# Patient Record
Sex: FEMALE | Race: WHITE | NOT HISPANIC OR LATINO | URBAN - METROPOLITAN AREA
[De-identification: names, ages, dates, MRNs, and addresses within clinical notes are randomized per-mention and may not be internally consistent; named-entity substitution may affect disease eponyms.]

---

## 2022-11-07 VITALS
HEIGHT: 63 IN | WEIGHT: 115.08 LBS | SYSTOLIC BLOOD PRESSURE: 171 MMHG | DIASTOLIC BLOOD PRESSURE: 108 MMHG | RESPIRATION RATE: 18 BRPM | OXYGEN SATURATION: 94 % | HEART RATE: 115 BPM | TEMPERATURE: 99 F

## 2022-11-07 LAB
ALBUMIN SERPL ELPH-MCNC: 3.2 G/DL — LOW (ref 3.3–5)
ALBUMIN SERPL ELPH-MCNC: 3.5 G/DL — SIGNIFICANT CHANGE UP (ref 3.3–5)
ALP SERPL-CCNC: 89 U/L — SIGNIFICANT CHANGE UP (ref 40–120)
ALP SERPL-CCNC: 90 U/L — SIGNIFICANT CHANGE UP (ref 40–120)
ALT FLD-CCNC: 6 U/L — LOW (ref 10–45)
ALT FLD-CCNC: SIGNIFICANT CHANGE UP (ref 10–45)
ANION GAP SERPL CALC-SCNC: 11 MMOL/L — SIGNIFICANT CHANGE UP (ref 5–17)
ANION GAP SERPL CALC-SCNC: 11 MMOL/L — SIGNIFICANT CHANGE UP (ref 5–17)
APTT BLD: 25.4 SEC — LOW (ref 27.5–35.5)
AST SERPL-CCNC: 11 U/L — SIGNIFICANT CHANGE UP (ref 10–40)
AST SERPL-CCNC: SIGNIFICANT CHANGE UP (ref 10–40)
BASOPHILS # BLD AUTO: 0.04 K/UL — SIGNIFICANT CHANGE UP (ref 0–0.2)
BASOPHILS NFR BLD AUTO: 0.5 % — SIGNIFICANT CHANGE UP (ref 0–2)
BILIRUB SERPL-MCNC: 0.7 MG/DL — SIGNIFICANT CHANGE UP (ref 0.2–1.2)
BILIRUB SERPL-MCNC: 0.8 MG/DL — SIGNIFICANT CHANGE UP (ref 0.2–1.2)
BUN SERPL-MCNC: 14 MG/DL — SIGNIFICANT CHANGE UP (ref 7–23)
BUN SERPL-MCNC: 14 MG/DL — SIGNIFICANT CHANGE UP (ref 7–23)
CALCIUM SERPL-MCNC: 10 MG/DL — SIGNIFICANT CHANGE UP (ref 8.4–10.5)
CALCIUM SERPL-MCNC: 10 MG/DL — SIGNIFICANT CHANGE UP (ref 8.4–10.5)
CHLORIDE SERPL-SCNC: 98 MMOL/L — SIGNIFICANT CHANGE UP (ref 96–108)
CHLORIDE SERPL-SCNC: 98 MMOL/L — SIGNIFICANT CHANGE UP (ref 96–108)
CK SERPL-CCNC: SIGNIFICANT CHANGE UP (ref 25–170)
CO2 SERPL-SCNC: 22 MMOL/L — SIGNIFICANT CHANGE UP (ref 22–31)
CO2 SERPL-SCNC: 24 MMOL/L — SIGNIFICANT CHANGE UP (ref 22–31)
CREAT SERPL-MCNC: 0.75 MG/DL — SIGNIFICANT CHANGE UP (ref 0.5–1.3)
CREAT SERPL-MCNC: 0.79 MG/DL — SIGNIFICANT CHANGE UP (ref 0.5–1.3)
EGFR: 68 ML/MIN/1.73M2 — SIGNIFICANT CHANGE UP
EGFR: 72 ML/MIN/1.73M2 — SIGNIFICANT CHANGE UP
EOSINOPHIL # BLD AUTO: 0.18 K/UL — SIGNIFICANT CHANGE UP (ref 0–0.5)
EOSINOPHIL NFR BLD AUTO: 2.2 % — SIGNIFICANT CHANGE UP (ref 0–6)
GLUCOSE SERPL-MCNC: 131 MG/DL — HIGH (ref 70–99)
GLUCOSE SERPL-MCNC: 145 MG/DL — HIGH (ref 70–99)
HCT VFR BLD CALC: 31.8 % — LOW (ref 34.5–45)
HGB BLD-MCNC: 10.1 G/DL — LOW (ref 11.5–15.5)
IMM GRANULOCYTES NFR BLD AUTO: 1 % — HIGH (ref 0–0.9)
INR BLD: 1.19 — HIGH (ref 0.88–1.16)
LYMPHOCYTES # BLD AUTO: 0.63 K/UL — LOW (ref 1–3.3)
LYMPHOCYTES # BLD AUTO: 7.6 % — LOW (ref 13–44)
MAGNESIUM SERPL-MCNC: 2 MG/DL — SIGNIFICANT CHANGE UP (ref 1.6–2.6)
MCHC RBC-ENTMCNC: 28.8 PG — SIGNIFICANT CHANGE UP (ref 27–34)
MCHC RBC-ENTMCNC: 31.8 GM/DL — LOW (ref 32–36)
MCV RBC AUTO: 90.6 FL — SIGNIFICANT CHANGE UP (ref 80–100)
MONOCYTES # BLD AUTO: 0.8 K/UL — SIGNIFICANT CHANGE UP (ref 0–0.9)
MONOCYTES NFR BLD AUTO: 9.6 % — SIGNIFICANT CHANGE UP (ref 2–14)
NEUTROPHILS # BLD AUTO: 6.61 K/UL — SIGNIFICANT CHANGE UP (ref 1.8–7.4)
NEUTROPHILS NFR BLD AUTO: 79.1 % — HIGH (ref 43–77)
NRBC # BLD: 0 /100 WBCS — SIGNIFICANT CHANGE UP (ref 0–0)
PLATELET # BLD AUTO: 260 K/UL — SIGNIFICANT CHANGE UP (ref 150–400)
POTASSIUM SERPL-MCNC: 4.2 MMOL/L — SIGNIFICANT CHANGE UP (ref 3.5–5.3)
POTASSIUM SERPL-MCNC: SIGNIFICANT CHANGE UP (ref 3.5–5.3)
POTASSIUM SERPL-SCNC: 4.2 MMOL/L — SIGNIFICANT CHANGE UP (ref 3.5–5.3)
POTASSIUM SERPL-SCNC: SIGNIFICANT CHANGE UP (ref 3.5–5.3)
PROT SERPL-MCNC: 6.4 G/DL — SIGNIFICANT CHANGE UP (ref 6–8.3)
PROT SERPL-MCNC: 6.4 G/DL — SIGNIFICANT CHANGE UP (ref 6–8.3)
PROTHROM AB SERPL-ACNC: 14.2 SEC — HIGH (ref 10.5–13.4)
RBC # BLD: 3.51 M/UL — LOW (ref 3.8–5.2)
RBC # FLD: 14.7 % — HIGH (ref 10.3–14.5)
SODIUM SERPL-SCNC: 131 MMOL/L — LOW (ref 135–145)
SODIUM SERPL-SCNC: 133 MMOL/L — LOW (ref 135–145)
WBC # BLD: 8.34 K/UL — SIGNIFICANT CHANGE UP (ref 3.8–10.5)
WBC # FLD AUTO: 8.34 K/UL — SIGNIFICANT CHANGE UP (ref 3.8–10.5)

## 2022-11-07 PROCEDURE — 93970 EXTREMITY STUDY: CPT | Mod: 26

## 2022-11-07 PROCEDURE — 93010 ELECTROCARDIOGRAM REPORT: CPT

## 2022-11-07 PROCEDURE — 73630 X-RAY EXAM OF FOOT: CPT | Mod: 26,50

## 2022-11-07 PROCEDURE — 99284 EMERGENCY DEPT VISIT MOD MDM: CPT | Mod: FS

## 2022-11-07 NOTE — ED ADULT NURSE NOTE - NSIMPLEMENTINTERV_GEN_ALL_ED
Implemented All Fall with Harm Risk Interventions:  [place] to call system. Call bell, personal items and telephone within reach. Instruct patient to call for assistance. Room bathroom lighting operational. Non-slip footwear when patient is off stretcher. Physically safe environment: no spills, clutter or unnecessary equipment. Stretcher in lowest position, wheels locked, appropriate side rails in place. Provide visual cue, wrist band, yellow gown, etc. Monitor gait and stability. Monitor for mental status changes and reorient to person, place, and time. Review medications for side effects contributing to fall risk. Reinforce activity limits and safety measures with patient and family. Provide visual clues: red socks.

## 2022-11-07 NOTE — ED ADULT NURSE NOTE - OBJECTIVE STATEMENT
Pt arrived to ED alert, spont unlab breathing on RA, NAD. PT is c/o bilat foot pain that worsens when she tries to walk on them x 4 days. Pt denies trauma or injury prior to onset of pain. Pt reports intermittent knee pain. no obvious signs of trauma.

## 2022-11-07 NOTE — ED ADULT TRIAGE NOTE - CHIEF COMPLAINT QUOTE
BIBEMS from home. Pt AOX1. C/o b/l leg pain x3days. Denies injuries/falls/trauma. No obvious deformity noted. Pt hypertensive on arrival. As per son in law "we don't know if she is taking her medications". Lives alone-- HHA 3 times a wk. Denies fevers, chills, n/v, chest pain, SOB.

## 2022-11-08 ENCOUNTER — INPATIENT (INPATIENT)
Facility: HOSPITAL | Age: 87
LOS: 0 days | Discharge: NURSING, REHAB OR LONG-TERM CARE | DRG: 914 | End: 2022-11-08
Admitting: STUDENT IN AN ORGANIZED HEALTH CARE EDUCATION/TRAINING PROGRAM
Payer: COMMERCIAL

## 2022-11-08 ENCOUNTER — TRANSCRIPTION ENCOUNTER (OUTPATIENT)
Age: 87
End: 2022-11-08

## 2022-11-08 VITALS
SYSTOLIC BLOOD PRESSURE: 158 MMHG | DIASTOLIC BLOOD PRESSURE: 90 MMHG | TEMPERATURE: 98 F | RESPIRATION RATE: 18 BRPM | OXYGEN SATURATION: 97 % | HEART RATE: 99 BPM

## 2022-11-08 DIAGNOSIS — R63.8 OTHER SYMPTOMS AND SIGNS CONCERNING FOOD AND FLUID INTAKE: ICD-10-CM

## 2022-11-08 DIAGNOSIS — I10 ESSENTIAL (PRIMARY) HYPERTENSION: ICD-10-CM

## 2022-11-08 DIAGNOSIS — M25.562 PAIN IN LEFT KNEE: ICD-10-CM

## 2022-11-08 LAB
ALBUMIN SERPL ELPH-MCNC: 3.2 G/DL — LOW (ref 3.3–5)
ALP SERPL-CCNC: 89 U/L — SIGNIFICANT CHANGE UP (ref 40–120)
ALT FLD-CCNC: 5 U/L — LOW (ref 10–45)
ANION GAP SERPL CALC-SCNC: 8 MMOL/L — SIGNIFICANT CHANGE UP (ref 5–17)
AST SERPL-CCNC: 10 U/L — SIGNIFICANT CHANGE UP (ref 10–40)
BASOPHILS # BLD AUTO: 0.03 K/UL — SIGNIFICANT CHANGE UP (ref 0–0.2)
BASOPHILS NFR BLD AUTO: 0.5 % — SIGNIFICANT CHANGE UP (ref 0–2)
BILIRUB SERPL-MCNC: 0.8 MG/DL — SIGNIFICANT CHANGE UP (ref 0.2–1.2)
BUN SERPL-MCNC: 16 MG/DL — SIGNIFICANT CHANGE UP (ref 7–23)
CALCIUM SERPL-MCNC: 10.1 MG/DL — SIGNIFICANT CHANGE UP (ref 8.4–10.5)
CHLORIDE SERPL-SCNC: 99 MMOL/L — SIGNIFICANT CHANGE UP (ref 96–108)
CO2 SERPL-SCNC: 27 MMOL/L — SIGNIFICANT CHANGE UP (ref 22–31)
CREAT SERPL-MCNC: 0.92 MG/DL — SIGNIFICANT CHANGE UP (ref 0.5–1.3)
CRP SERPL-MCNC: 120 MG/L — HIGH (ref 0–4)
EGFR: 57 ML/MIN/1.73M2 — LOW
EOSINOPHIL # BLD AUTO: 0.01 K/UL — SIGNIFICANT CHANGE UP (ref 0–0.5)
EOSINOPHIL NFR BLD AUTO: 0.2 % — SIGNIFICANT CHANGE UP (ref 0–6)
GLUCOSE SERPL-MCNC: 124 MG/DL — HIGH (ref 70–99)
HCT VFR BLD CALC: 31.5 % — LOW (ref 34.5–45)
HGB BLD-MCNC: 9.8 G/DL — LOW (ref 11.5–15.5)
IMM GRANULOCYTES NFR BLD AUTO: 1.3 % — HIGH (ref 0–0.9)
LYMPHOCYTES # BLD AUTO: 0.78 K/UL — LOW (ref 1–3.3)
LYMPHOCYTES # BLD AUTO: 12.6 % — LOW (ref 13–44)
MAGNESIUM SERPL-MCNC: 2.1 MG/DL — SIGNIFICANT CHANGE UP (ref 1.6–2.6)
MCHC RBC-ENTMCNC: 28.6 PG — SIGNIFICANT CHANGE UP (ref 27–34)
MCHC RBC-ENTMCNC: 31.1 GM/DL — LOW (ref 32–36)
MCV RBC AUTO: 91.8 FL — SIGNIFICANT CHANGE UP (ref 80–100)
MONOCYTES # BLD AUTO: 0.71 K/UL — SIGNIFICANT CHANGE UP (ref 0–0.9)
MONOCYTES NFR BLD AUTO: 11.4 % — SIGNIFICANT CHANGE UP (ref 2–14)
NEUTROPHILS # BLD AUTO: 4.6 K/UL — SIGNIFICANT CHANGE UP (ref 1.8–7.4)
NEUTROPHILS NFR BLD AUTO: 74 % — SIGNIFICANT CHANGE UP (ref 43–77)
NRBC # BLD: 0 /100 WBCS — SIGNIFICANT CHANGE UP (ref 0–0)
PHOSPHATE SERPL-MCNC: 3 MG/DL — SIGNIFICANT CHANGE UP (ref 2.5–4.5)
PLATELET # BLD AUTO: 236 K/UL — SIGNIFICANT CHANGE UP (ref 150–400)
POTASSIUM SERPL-MCNC: 4.1 MMOL/L — SIGNIFICANT CHANGE UP (ref 3.5–5.3)
POTASSIUM SERPL-SCNC: 4.1 MMOL/L — SIGNIFICANT CHANGE UP (ref 3.5–5.3)
PROT SERPL-MCNC: 5.9 G/DL — LOW (ref 6–8.3)
RBC # BLD: 3.43 M/UL — LOW (ref 3.8–5.2)
RBC # FLD: 14.4 % — SIGNIFICANT CHANGE UP (ref 10.3–14.5)
SARS-COV-2 RNA SPEC QL NAA+PROBE: NEGATIVE — SIGNIFICANT CHANGE UP
SODIUM SERPL-SCNC: 134 MMOL/L — LOW (ref 135–145)
WBC # BLD: 6.21 K/UL — SIGNIFICANT CHANGE UP (ref 3.8–10.5)
WBC # FLD AUTO: 6.21 K/UL — SIGNIFICANT CHANGE UP (ref 3.8–10.5)

## 2022-11-08 PROCEDURE — 83880 ASSAY OF NATRIURETIC PEPTIDE: CPT

## 2022-11-08 PROCEDURE — 99285 EMERGENCY DEPT VISIT HI MDM: CPT

## 2022-11-08 PROCEDURE — 99221 1ST HOSP IP/OBS SF/LOW 40: CPT

## 2022-11-08 PROCEDURE — 85730 THROMBOPLASTIN TIME PARTIAL: CPT

## 2022-11-08 PROCEDURE — 73630 X-RAY EXAM OF FOOT: CPT

## 2022-11-08 PROCEDURE — 85610 PROTHROMBIN TIME: CPT

## 2022-11-08 PROCEDURE — 86140 C-REACTIVE PROTEIN: CPT

## 2022-11-08 PROCEDURE — 73562 X-RAY EXAM OF KNEE 3: CPT

## 2022-11-08 PROCEDURE — 83735 ASSAY OF MAGNESIUM: CPT

## 2022-11-08 PROCEDURE — 84100 ASSAY OF PHOSPHORUS: CPT

## 2022-11-08 PROCEDURE — 93005 ELECTROCARDIOGRAM TRACING: CPT

## 2022-11-08 PROCEDURE — 87635 SARS-COV-2 COVID-19 AMP PRB: CPT

## 2022-11-08 PROCEDURE — 80053 COMPREHEN METABOLIC PANEL: CPT

## 2022-11-08 PROCEDURE — 82550 ASSAY OF CK (CPK): CPT

## 2022-11-08 PROCEDURE — 36415 COLL VENOUS BLD VENIPUNCTURE: CPT

## 2022-11-08 PROCEDURE — 99223 1ST HOSP IP/OBS HIGH 75: CPT | Mod: GC,AI

## 2022-11-08 PROCEDURE — 85025 COMPLETE CBC W/AUTO DIFF WBC: CPT

## 2022-11-08 PROCEDURE — 97162 PT EVAL MOD COMPLEX 30 MIN: CPT

## 2022-11-08 PROCEDURE — 93970 EXTREMITY STUDY: CPT

## 2022-11-08 PROCEDURE — 73562 X-RAY EXAM OF KNEE 3: CPT | Mod: 26,LT

## 2022-11-08 PROCEDURE — 97161 PT EVAL LOW COMPLEX 20 MIN: CPT

## 2022-11-08 RX ORDER — ACETAMINOPHEN 500 MG
975 TABLET ORAL ONCE
Refills: 0 | Status: COMPLETED | OUTPATIENT
Start: 2022-11-08 | End: 2022-11-08

## 2022-11-08 RX ORDER — INFLUENZA VIRUS VACCINE 15; 15; 15; 15 UG/.5ML; UG/.5ML; UG/.5ML; UG/.5ML
0.7 SUSPENSION INTRAMUSCULAR ONCE
Refills: 0 | Status: DISCONTINUED | OUTPATIENT
Start: 2022-11-08 | End: 2022-11-08

## 2022-11-08 RX ORDER — HYDROCHLOROTHIAZIDE 25 MG
12.5 TABLET ORAL DAILY
Refills: 0 | Status: DISCONTINUED | OUTPATIENT
Start: 2022-11-08 | End: 2022-11-08

## 2022-11-08 RX ORDER — METOPROLOL TARTRATE 50 MG
100 TABLET ORAL DAILY
Refills: 0 | Status: DISCONTINUED | OUTPATIENT
Start: 2022-11-08 | End: 2022-11-08

## 2022-11-08 RX ORDER — SPIRONOLACTONE 25 MG/1
25 TABLET, FILM COATED ORAL DAILY
Refills: 0 | Status: DISCONTINUED | OUTPATIENT
Start: 2022-11-08 | End: 2022-11-08

## 2022-11-08 RX ORDER — TELMISARTAN 20 MG/1
1 TABLET ORAL
Qty: 0 | Refills: 0 | DISCHARGE

## 2022-11-08 RX ORDER — AMLODIPINE BESYLATE 2.5 MG/1
10 TABLET ORAL DAILY
Refills: 0 | Status: DISCONTINUED | OUTPATIENT
Start: 2022-11-08 | End: 2022-11-08

## 2022-11-08 RX ORDER — METOPROLOL TARTRATE 50 MG
1 TABLET ORAL
Qty: 0 | Refills: 0 | DISCHARGE

## 2022-11-08 RX ORDER — SPIRONOLACTONE 25 MG/1
50 TABLET, FILM COATED ORAL DAILY
Refills: 0 | Status: DISCONTINUED | OUTPATIENT
Start: 2022-11-08 | End: 2022-11-08

## 2022-11-08 RX ORDER — ACETAMINOPHEN 500 MG
650 TABLET ORAL EVERY 6 HOURS
Refills: 0 | Status: DISCONTINUED | OUTPATIENT
Start: 2022-11-08 | End: 2022-11-08

## 2022-11-08 RX ORDER — DICLOFENAC SODIUM 30 MG/G
4 GEL TOPICAL
Qty: 480 | Refills: 0
Start: 2022-11-08 | End: 2022-12-07

## 2022-11-08 RX ORDER — TELMISARTAN AND HYDROCHLOROTHIAZIDE 40; 12.5 MG/1; MG/1
1 TABLET ORAL
Qty: 0 | Refills: 0 | DISCHARGE

## 2022-11-08 RX ORDER — MIRTAZAPINE 45 MG/1
1 TABLET, ORALLY DISINTEGRATING ORAL
Qty: 0 | Refills: 0 | DISCHARGE

## 2022-11-08 RX ORDER — MIRTAZAPINE 45 MG/1
7.5 TABLET, ORALLY DISINTEGRATING ORAL DAILY
Refills: 0 | Status: DISCONTINUED | OUTPATIENT
Start: 2022-11-08 | End: 2022-11-08

## 2022-11-08 RX ORDER — SPIRONOLACTONE 25 MG/1
1 TABLET, FILM COATED ORAL
Qty: 0 | Refills: 0 | DISCHARGE

## 2022-11-08 RX ORDER — ZOLPIDEM TARTRATE 10 MG/1
1 TABLET ORAL
Qty: 0 | Refills: 0 | DISCHARGE

## 2022-11-08 RX ORDER — IBUPROFEN 200 MG
200 TABLET ORAL ONCE
Refills: 0 | Status: COMPLETED | OUTPATIENT
Start: 2022-11-08 | End: 2022-11-08

## 2022-11-08 RX ORDER — ENOXAPARIN SODIUM 100 MG/ML
30 INJECTION SUBCUTANEOUS EVERY 24 HOURS
Refills: 0 | Status: DISCONTINUED | OUTPATIENT
Start: 2022-11-08 | End: 2022-11-08

## 2022-11-08 RX ORDER — DICLOFENAC SODIUM 30 MG/G
4 GEL TOPICAL EVERY 8 HOURS
Refills: 0 | Status: DISCONTINUED | OUTPATIENT
Start: 2022-11-08 | End: 2022-11-08

## 2022-11-08 RX ORDER — MIRTAZAPINE 45 MG/1
2 TABLET, ORALLY DISINTEGRATING ORAL
Qty: 0 | Refills: 0 | DISCHARGE

## 2022-11-08 RX ORDER — LANOLIN ALCOHOL/MO/W.PET/CERES
3 CREAM (GRAM) TOPICAL AT BEDTIME
Refills: 0 | Status: DISCONTINUED | OUTPATIENT
Start: 2022-11-08 | End: 2022-11-08

## 2022-11-08 RX ORDER — AMLODIPINE BESYLATE 2.5 MG/1
1 TABLET ORAL
Qty: 0 | Refills: 0 | DISCHARGE

## 2022-11-08 RX ORDER — LOSARTAN POTASSIUM 100 MG/1
100 TABLET, FILM COATED ORAL DAILY
Refills: 0 | Status: DISCONTINUED | OUTPATIENT
Start: 2022-11-08 | End: 2022-11-08

## 2022-11-08 RX ORDER — DICLOFENAC SODIUM 30 MG/G
4 GEL TOPICAL
Qty: 360 | Refills: 0
Start: 2022-11-08 | End: 2022-12-07

## 2022-11-08 RX ADMIN — Medication 200 MILLIGRAM(S): at 11:30

## 2022-11-08 RX ADMIN — Medication 975 MILLIGRAM(S): at 01:10

## 2022-11-08 RX ADMIN — DICLOFENAC SODIUM 4 GRAM(S): 30 GEL TOPICAL at 13:16

## 2022-11-08 RX ADMIN — ENOXAPARIN SODIUM 30 MILLIGRAM(S): 100 INJECTION SUBCUTANEOUS at 13:14

## 2022-11-08 RX ADMIN — Medication 200 MILLIGRAM(S): at 10:53

## 2022-11-08 NOTE — H&P ADULT - PROBLEM SELECTOR PLAN 1
Patient presenting with 3 day history of left knee pain and associated bilateral foot pain. Inability to bear weight on his feet the past day, so brought into ED by son. Doppler U/S negative for DVTs, but did show 5cm Baker cyst. Knee pain 2/2 knee tendon injury, cyst, or pleural effusion.  - F/u official read of left knee X-ray  - F/u official read of b/l foot X-rays  - PT/OT consult  - Consider ortho consult for joint injury

## 2022-11-08 NOTE — CONSULT NOTE ADULT - ASSESSMENT
per Internal Medicine    89 y o F w/ HTN, vertigo, urinary incontinence, presents with 3-day h/o b/l foot pain and left knee pain, and inability to bear weight today, admitted for PT and pain control.      Problem/Plan - 1:  ·  Problem: Left knee pain.   ·  Plan: Patient presenting with 3 day history of left knee pain and associated bilateral foot pain. Inability to bear weight on his feet the past day, so brought into ED by son. Doppler U/S negative for DVTs, but did show 5cm Baker cyst. Knee pain 2/2 knee tendon injury, cyst, or pleural effusion.  - F/u official read of left knee X-ray  - F/u official read of b/l foot X-rays  - PT/OT consult  - Consider ortho consult for joint injury.    Problem/Plan - 2:  ·  Problem: HTN (hypertension).   ·  Plan: Hypertensive in arrival. Patient reportedly not taking her medications at home.  - Obtain official med rec  - Restart hypertension medications.    Problem/Plan - 3:  ·  Problem: Nutrition, metabolism, and development symptoms.   ·  Plan: F: None   E: Replete for K<4, Mag<2  N: Regular Diet  A: None  Code status: Full  Dispo: RMF.

## 2022-11-08 NOTE — DISCHARGE NOTE PROVIDER - NSDCFUADDAPPT_GEN_ALL_CORE_FT
Please follow up with your primary care doctor within 2 weeks of discharge to follow up for your knee and foot pain.  Please follow up with your primary care doctor within 2 weeks of discharge to follow up for your knee and foot pain. Please be sure to bring the results of your xrays from this hospital admission, they are included in this document.

## 2022-11-08 NOTE — ED PROVIDER NOTE - OBJECTIVE STATEMENT
89 yr old female, history of HTN, vertigo, urinary incontinence, presents to the Emergency Department with leg pain. Per pt bilateral foot pain x 3 days. pain when standing / putting pressure on her feet. does not remember any falls / injury or trauma. son states that pt does not ambulate that much / frequently. does have home PT come 3 times a week, last came on friday and did not have any issues at that time. PT came today and pt was unable to ambulate due to pain which prompted son to bring her to the ED.  per son does not think pt has been taking her meds, possibly over last 2-3 weeks. is unclear what her most recent med list is. most recent list he has (years ago) pt was on multiple antihypertensives including amlodipine, micardis, metoprolol.

## 2022-11-08 NOTE — DISCHARGE NOTE PROVIDER - NSDCCPCAREPLAN_GEN_ALL_CORE_FT
PRINCIPAL DISCHARGE DIAGNOSIS  Diagnosis: Left knee pain  Assessment and Plan of Treatment: You came in with knee pain so severe that you could not stand. We took xrays of your knee to assess for what could be causing your pain. We did not find any evidence that there was infection in the joint space or bone. We prescribed you a cream that can help you with your knee pain (Diclofenac cream). Please continue taking this outside of the hospital, one application every 8 hours as needed for pain. This medication can also be found in pharmacies over the counter.      SECONDARY DISCHARGE DIAGNOSES  Diagnosis: Foot pain  Assessment and Plan of Treatment:      PRINCIPAL DISCHARGE DIAGNOSIS  Diagnosis: Left knee pain  Assessment and Plan of Treatment: You came in with knee pain so severe that you could not stand. We took xrays of your knee to assess for what could be causing your pain. We did not find any evidence that there was infection in the joint space or bone. We prescribed you a cream that can help you with your knee pain (Diclofenac cream). Please continue taking this outside of the hospital, one application every 8 hours as needed for pain. This medication can also be found in pharmacies over the counter.      SECONDARY DISCHARGE DIAGNOSES  Diagnosis: Foot pain  Assessment and Plan of Treatment: We also took an xray of your feet; we did not see any bone fractures or dislocations that may explain they pain that you are experiencing. We did note that your bone mineral density in your foot bones are low. Please schedule a follow up appointment with your primary care doctor within 2 weeks of leaving the hospital.     PRINCIPAL DISCHARGE DIAGNOSIS  Diagnosis: Left knee pain  Assessment and Plan of Treatment: You came in with knee pain so severe that you could not stand. We took xrays of your knee to assess for what could be causing your pain. We did not find any evidence that there was infection in the joint space or bone. We prescribed you a cream that can help you with your knee pain (Diclofenac cream). Please continue taking this outside of the hospital, one application every 8 hours as needed for pain. This medication can also be found in pharmacies over the counter.  Please continue to monitor CRP at rehab and pain severity at [facility].  Please follow up with your PCP to monitor knee pain.      SECONDARY DISCHARGE DIAGNOSES  Diagnosis: Foot pain  Assessment and Plan of Treatment: We also took an xray of your feet; we did not see any bone fractures or dislocations that may explain they pain that you are experiencing. We did note that your bone mineral density in your foot bones are low. Please schedule a follow up appointment with your primary care doctor within 2 weeks of leaving the hospital.

## 2022-11-08 NOTE — DISCHARGE NOTE NURSING/CASE MANAGEMENT/SOCIAL WORK - PATIENT PORTAL LINK FT
You can access the FollowMyHealth Patient Portal offered by [facility] by registering at the following website: [email or web address]. By joining [facility] [employer] [facility]’s FollowMyHealth portal, you will also be able to view your health information using other applications (apps) compatible with our system.

## 2022-11-08 NOTE — H&P ADULT - ASSESSMENT
89F w/ HTN, vertigo, urinary incontinence, presents with 3-day h/o b/l foot pain and left knee pain, and inability to bear weight today, admitted for PT and pain control.

## 2022-11-08 NOTE — PHYSICAL THERAPY INITIAL EVALUATION ADULT - IMPAIRMENTS FOUND, PT EVAL
aerobic capacity/endurance/arousal, attention, and cognition/cognitive impairment/gait, locomotion, and balance/gross motor/muscle strength/poor safety awareness/posture

## 2022-11-08 NOTE — CONSULT NOTE ADULT - SUBJECTIVE AND OBJECTIVE BOX
Orthopaedic Surgery Consult Note    For Surgeon:    HPI:  Patient is a 89y old  Female with osteoarthritis, presents with a chief complaint of chronic left knee pain, worsening for the past week. Pt sees PT at home 3 mornings a week. Pt denies seeing an orthopedic [place] for her knee pain in the past. Pt states she has been ambulatory with a cane/walker for the past year. Denies fever, chills, or knee warmth. Denies tingling/ numbness paresthesia or trauma to the affected extremity.         In the ED:  Initial vital signs: T: 99.4 F, HR: 115, BP: 171, R: 18, SpO2: 94% on RA  Labs: significant for Hgb 10.1, Na 131, proBNP 693  B/l LE U/S: no DVT, 5cm Baker cyst  Imaging:  CXR: None  EKG: Pending  Medications: Tylenol 1g  Consults: none  (08 Nov 2022 03:39)      Allergies    penicillin (Unknown)    Intolerances      PAST MEDICAL & SURGICAL HISTORY:    MEDICATIONS  (STANDING):  amLODIPine   Tablet 10 milliGRAM(s) Oral daily  cloNIDine 0.1 milliGRAM(s) Oral daily  diclofenac sodium 1% Gel 4 Gram(s) Topical every 8 hours  enoxaparin Injectable 30 milliGRAM(s) SubCutaneous every 24 hours  hydrochlorothiazide 12.5 milliGRAM(s) Oral daily  influenza  Vaccine (HIGH DOSE) 0.7 milliLiter(s) IntraMuscular once  losartan 100 milliGRAM(s) Oral daily  metoprolol succinate  milliGRAM(s) Oral daily  mirtazapine 7.5 milliGRAM(s) Oral daily  spironolactone 50 milliGRAM(s) Oral daily    MEDICATIONS  (PRN):  acetaminophen     Tablet .. 650 milliGRAM(s) Oral every 6 hours PRN Temp greater or equal to 38C (100.4F), Mild Pain (1 - 3)  melatonin 3 milliGRAM(s) Oral at bedtime PRN Insomnia      Vital Signs Last 24 Hrs  T(C): 36.6 (08 Nov 2022 09:45), Max: 37.4 (07 Nov 2022 19:10)  T(F): 97.8 (08 Nov 2022 09:45), Max: 99.4 (07 Nov 2022 19:10)  HR: 99 (08 Nov 2022 09:45) (85 - 115)  BP: 158/90 (08 Nov 2022 09:45) (140/78 - 185/92)  BP(mean): --  RR: 18 (08 Nov 2022 09:45) (16 - 18)  SpO2: 97% (08 Nov 2022 09:45) (94% - 97%)    Parameters below as of 08 Nov 2022 09:45  Patient On (Oxygen Delivery Method): room air        Physical Exam:  Left knee effusion w/o warmth or erythema  Chronic LE discoloration b/l  Sensation intact to light touch b/l  No TTP knee b/l, TTP in dorsum right foot   Motor: EHL/FHL/GS/TA 5/5 strength b/l   Full passive and active ROM w flexion/extension b/l                        9.8    6.21  )-----------( 236      ( 08 Nov 2022 06:06 )             31.5     11-08    134<L>  |  99  |  16  ----------------------------<  124<H>  4.1   |  27  |  0.92    Ca    10.1      08 Nov 2022 06:06  Phos  3.0     11-08  Mg     2.1     11-08    TPro  5.9<L>  /  Alb  3.2<L>  /  TBili  0.8  /  DBili  x   /  AST  10  /  ALT  5<L>  /  AlkPhos  89  11-08      Imaging:   X ray of L knee: No radiographic findings to suggest presence of osteomyelitis Joint effusion. No acute fracture or dislocation. Tricompartmental degenerative changes, joint space narrowing, osteophytosis chondrocalcinosis. Quadriceps tendon enthesophyte. Posterior soft tissue vascular calcification.   L knee US: 5X2X4 cm bakers cyst   .   A/P: 89y Female w/ left knee effusion 2/2 osteoarthritis   No concern for septic arthritis at this time given clinical picture-discussed with primary team who agrees  No indication for therapeutic drainage of left knee at this time, pt has full painless mobility  Recommend podiatry consult for pain in feet, TTP dorsum right foot    -Discussed with Dr. Maher    Ortho Pager [phone]   Orthopaedic Surgery Consult Note  HPI:  Patient is a 89y old  Female with osteoarthritis, presents with a chief complaint of chronic left knee pain, worsening for the past week. Pt reports she is not focused on her knee pain as it is her bilateral foot pain that is bothering her and keeping her from mobilizing around her apartment. Pt sees PT at home 3 mornings a week. She denies accident or injury to bring on pain. Pt denies seeing an orthopedic provider for her knee pain in the past. Pt states she has been ambulatory but has been requiring a cane/walker for the past year due to knee and foot pain. She lives alone. Denies fever, chills, recent illness, or knee warmth or rashes. Denies tingling/ numbness paresthesia or trauma to the affected extremity.     PMHx:  HTN, vertigo, Urinary incontinence  PSHx: noncontributory    Allergies: penicillin (Unknown)    MEDICATIONS  (STANDING):  amLODIPine   Tablet 10 milliGRAM(s) Oral daily  cloNIDine 0.1 milliGRAM(s) Oral daily  diclofenac sodium 1% Gel 4 Gram(s) Topical every 8 hours  enoxaparin Injectable 30 milliGRAM(s) SubCutaneous every 24 hours  hydrochlorothiazide 12.5 milliGRAM(s) Oral daily  influenza  Vaccine (HIGH DOSE) 0.7 milliLiter(s) IntraMuscular once  losartan 100 milliGRAM(s) Oral daily  metoprolol succinate  milliGRAM(s) Oral daily  mirtazapine 7.5 milliGRAM(s) Oral daily  spironolactone 50 milliGRAM(s) Oral daily    Vital Signs Last 24 Hrs  T(C): 36.6 (08 Nov 2022 09:45), Max: 37.4 (07 Nov 2022 19:10)  T(F): 97.8 (08 Nov 2022 09:45), Max: 99.4 (07 Nov 2022 19:10)  HR: 99 (08 Nov 2022 09:45) (85 - 115)  BP: 158/90 (08 Nov 2022 09:45) (140/78 - 185/92)  BP(mean): --  RR: 18 (08 Nov 2022 09:45) (16 - 18)  SpO2: 97% (08 Nov 2022 09:45) (94% - 97%)    Parameters below as of 08 Nov 2022 09:45  Patient On (Oxygen Delivery Method): room air      Physical Exam:  General: comfortable, NAD, pleasant, hard of hearing, alert and oriented x 2-3.   MSK: Left knee w/o warmth or erythema, no rash or lesions, +effusion  No joint line tenderness to right knee medial or lateral. No patella or anterior TTP. No TTP throughout knee   Full passive ROM without pain or difficulty, full active ROM in all planes without pain or difficulty   Chronic LE discoloration b/l  Sensation intact to light touch b/l  Motor: EHL/FHL/GS/TA 5/5 strength BLE   +TTP to dorsum of right foot more than left. No deformity. Chronic disoloration+/- erythema.                         9.8    6.21  )-----------( 236      ( 08 Nov 2022 06:06 )             31.5     11-08    134<L>  |  99  |  16  ----------------------------<  124<H>  4.1   |  27  |  0.92    Ca    10.1      08 Nov 2022 06:06  Phos  3.0     11-08  Mg     2.1     11-08  TPro  5.9<L>  /  Alb  3.2<L>  /  TBili  0.8  /  DBili  x   /  AST  10  /  ALT  5<L>  /  AlkPhos  89  11-08      Imaging:   X ray of L knee: No radiographic findings to suggest presence of osteomyelitis Joint effusion. No acute fracture or dislocation. Tricompartmental degenerative changes, joint space narrowing, osteophytosis chondrocalcinosis. Quadriceps tendon enthesophyte. Posterior soft tissue vascular calcification.   L knee US: 5X2X4 cm bakers cyst     A/P: 89y Female w/ left knee effusion and tricompartmental OA on XR   No concern for septic arthritis at this time given clinical picture-discussed with primary team who agrees- no WBC elevation, afebrile, full ROM without difficulty to knee, isolated CRP elevation   No indication for therapeutic drainage of left knee at this time, pt has full painless mobility  Recommend podiatry consult for right > left foot pain/TTP and inability to ambulate due to foot pain.   Imaging and labs reviewed as discussed above, d/w Dr. Maher  Discussed with Dr. Maher who agrees with plan   Pain control  Ortho Pager [phone]

## 2022-11-08 NOTE — H&P ADULT - PROBLEM SELECTOR PLAN 2
Hypertensive in arrival. Patient reportedly not taking her medications at home.  - Obtain official med rec  - Restart hypertension medications

## 2022-11-08 NOTE — ED PROVIDER NOTE - NS ED ATTENDING STATEMENT MOD
This was a shared visit with the AMINTA. I reviewed and verified the documentation and independently performed the documented:

## 2022-11-08 NOTE — CONSULT NOTE ADULT - SUBJECTIVE AND OBJECTIVE BOX
Patient is a 89y old  Female who presents with a chief complaint of       HPI:  89F w/ HTN, vertigo, urinary incontinence, presents with 3-day h/o b/l foot pain and left knee pain, and inability to bear weight today. She does not remember any falls, injury, or trauma. Son states that patient has home PT 3 times per week, most recently on Friday, and did not have any issues at that time. Patient reports that she lives alone. Son does not think pt has been taking her meds for several weeks and needs more help at home. Reports no recent hospitalizations, but has been to [facility] in the past. Patient denies f/c, headaches, chest pain, shortness of breath, abdominal pain, N/V/D/C.    In the ED:  Initial vital signs: T: 99.4 F, HR: 115, BP: 171, R: 18, SpO2: 94% on RA  Labs: significant for Hgb 10.1, Na 131, proBNP 693  B/l LE U/S: no DVT, 5cm Baker cyst  Imaging:  CXR: None  EKG: Pending  Medications: Tylenol 1g  Consults: none  (08 Nov 2022 03:39)      PAST MEDICAL & SURGICAL HISTORY:      MEDICATIONS  (STANDING):  amLODIPine   Tablet 10 milliGRAM(s) Oral daily  cloNIDine 0.1 milliGRAM(s) Oral daily  diclofenac sodium 1% Gel 4 Gram(s) Topical every 8 hours  enoxaparin Injectable 30 milliGRAM(s) SubCutaneous every 24 hours  hydrochlorothiazide 12.5 milliGRAM(s) Oral daily  influenza  Vaccine (HIGH DOSE) 0.7 milliLiter(s) IntraMuscular once  losartan 100 milliGRAM(s) Oral daily  metoprolol succinate  milliGRAM(s) Oral daily  mirtazapine 7.5 milliGRAM(s) Oral daily  spironolactone 50 milliGRAM(s) Oral daily    MEDICATIONS  (PRN):  acetaminophen     Tablet .. 650 milliGRAM(s) Oral every 6 hours PRN Temp greater or equal to 38C (100.4F), Mild Pain (1 - 3)  melatonin 3 milliGRAM(s) Oral at bedtime PRN Insomnia        Social History:                   -  Home Living Status :  lives alone in an elevator accessible apartment building            -  Prior Home Care Services :  none            -  Family support : live in [place]      Baseline Functional Level Prior to Admission :             - ADL's/ IADL's :  independent            - Ambulatory status prior to admission :   walks with a rolling walker       FAMILY HISTORY:    CBC Full  -  ( 08 Nov 2022 06:06 )  WBC Count : 6.21 K/uL  RBC Count : 3.43 M/uL  Hemoglobin : 9.8 g/dL  Hematocrit : 31.5 %  Platelet Count - Automated : 236 K/uL  Mean Cell Volume : 91.8 fl  Mean Cell Hemoglobin : 28.6 pg  Mean Cell Hemoglobin Concentration : 31.1 gm/dL  Auto Neutrophil # : 4.60 K/uL  Auto Lymphocyte # : 0.78 K/uL  Auto Monocyte # : 0.71 K/uL  Auto Eosinophil # : 0.01 K/uL  Auto Basophil # : 0.03 K/uL  Auto Neutrophil % : 74.0 %  Auto Lymphocyte % : 12.6 %  Auto Monocyte % : 11.4 %  Auto Eosinophil % : 0.2 %  Auto Basophil % : 0.5 %      11-08    134<L>  |  99  |  16  ----------------------------<  124<H>  4.1   |  27  |  0.92    Ca    10.1      08 Nov 2022 06:06  Phos  3.0     11-08  Mg     2.1     11-08    TPro  5.9<L>  /  Alb  3.2<L>  /  TBili  0.8  /  DBili  x   /  AST  10  /  ALT  5<L>  /  AlkPhos  89  11-08            Radiology :     < from: Xray Knee 3 Views, Left (11.08.22 @ 02:33) >  ACC: [id number] EXAM:  XR KNEE 3 VIEWS LT                          PROCEDURE DATE:  11/08/2022          INTERPRETATION:  Clinical history reason for exam: Soft tissue swelling.    3 views.    No comparison.    Findings/  impression: Demineralization.No radiographic findings to suggest   presence of osteomyelitis. Joint effusion No acute fracture or   dislocation. Tricompartmental degenerative changes, joint space   narrowing, osteophytosis, chondrocalcinosis. Quadriceps tendon   enthesophyte. Posterior soft tissue vascular calcification.                      Vital Signs Last 24 Hrs  T(C): 36.6 (08 Nov 2022 09:45), Max: 37.4 (07 Nov 2022 19:10)  T(F): 97.8 (08 Nov 2022 09:45), Max: 99.4 (07 Nov 2022 19:10)  HR: 99 (08 Nov 2022 09:45) (85 - 115)  BP: 158/90 (08 Nov 2022 09:45) (140/78 - 185/92)  BP(mean): --  RR: 18 (08 Nov 2022 09:45) (16 - 18)  SpO2: 97% (08 Nov 2022 09:45) (94% - 97%)    Parameters below as of 08 Nov 2022 09:45  Patient On (Oxygen Delivery Method): room air            REVIEW OF SYSTEMS:  per hpi         Physical Exam:  frail 89 y o woman lying comfortably in semi Hernandez's position , awake , alert , no current complaints     Head : normocephalic , atraumatic    Eyes : PERRLA , EOMI , no nystagmus , sclera anicteric    ENT : nasal discharge , uvula midline , no oropharyngeal erythema / exudate    Neck : supple , negative JVD , negative carotid bruits , no thyromegaly    Chest : CTA bilaterally , neg wheeze / rhonchi / rales / crackles / egophany    Cardiovascular: regular rate and rhythm , neg murmurs / rubs / gallops    Abdomen : soft , non distended , non tender to palpation in all 4 quadrants , negative rebound / guarding , normal bowel sounds    Extremities : WWP , neg cyanosis /clubbing / edema     Neurologic Exam:    Alert and oriented to person , place , speech fluent w/o dysarthria , follows commands       Motor Exam:          Right UE:               no focal weakness ,  > 3+/5 throughout     Left UE:                 no focal weakness,  > 3+/5 throughout          Right LE:                no focal weakness,  > 3+/5 throughout         Left LE:                  no focal weakness,  > 3+/5 throughout            Sensation:         intact to light touch x 4 extremities                         DTR :                     biceps/brachioradialis : equal                                              patella/ankle : equal       Gait :  not tested        PM&R Impression :     1) deconditioned    2) no focal weakness      Recommendations / Plan :     1) Physical / Occupational therapy focusing on therapeutic exercises , equipment evaluation , bed mobility/transfer out of bed evaluation , progressive ambulation with assistive devices prn .    2) Disposition Plan / Recs  :    subacute rehab placement

## 2022-11-08 NOTE — OCCUPATIONAL THERAPY INITIAL EVALUATION ADULT - WEIGHT-BEARING RESTRICTIONS: STAND/SIT, REHAB EVAL
x3 trials attempted, able to perform x1 trial successfully, however unable to achieve upright posture d/t R lateral foot pain

## 2022-11-08 NOTE — OCCUPATIONAL THERAPY INITIAL EVALUATION ADULT - DIAGNOSIS, OT EVAL
OT deficits include impaired strength, increased pain in R foot, and impaired balance affecting ability to complete ADL, functional transfers and mobiliyt.

## 2022-11-08 NOTE — H&P ADULT - NSHPPHYSICALEXAM_GEN_ALL_CORE
.  VITAL SIGNS:  T(C): 36.9 (11-08-22 @ 03:45), Max: 37.4 (11-07-22 @ 19:10)  T(F): 98.4 (11-08-22 @ 03:45), Max: 99.4 (11-07-22 @ 19:10)  HR: 104 (11-08-22 @ 03:45) (85 - 115)  BP: 140/78 (11-08-22 @ 03:45) (140/78 - 185/92)  BP(mean): --  RR: 18 (11-08-22 @ 03:45) (16 - 18)  SpO2: 96% (11-08-22 @ 03:45) (94% - 97%)  Wt(kg): --    PHYSICAL EXAM:    General: NAD  HEENT: NC/AT; PERRL, anicteric sclera; MMM  Neck: supple  Cardiovascular: +S1/S2; RRR  Respiratory: CTA B/L; no W/R/R  Gastrointestinal: soft, NT/ND; +BSx4  Extremities: WWP; no edema, clubbing or cyanosis; left knee TTP; b/l feet TTP  Vascular: 2+ radial, DP/PT pulses B/L  Neurological: AAOx3; no focal deficits

## 2022-11-08 NOTE — OCCUPATIONAL THERAPY INITIAL EVALUATION ADULT - NS ASR FOLLOW COMMAND OT EVAL
? if d/t [place] vs impaired command following/75% of the time/able to follow single-step instructions

## 2022-11-08 NOTE — OCCUPATIONAL THERAPY INITIAL EVALUATION ADULT - ORIENTATION, REHAB EVAL
Oriented to person and her birthday, oriented to correct month however not oriented to the year (patient's friend at bedside stating patient is joking, however patient unable to provide correct year). Patient able to state she was in a hospital, however unable to provide the name of the hospital.

## 2022-11-08 NOTE — H&P ADULT - HISTORY OF PRESENT ILLNESS
In the ED:  Initial vital signs: T: 99.4 F, HR: 115, BP: 171, R: 18, SpO2: 94% on RA  Labs: significant for Hgb 10.1, Na 131, proBNP 693  B/l LE U/S: no DVT, 5cm Baker cyst  Imaging:  CXR: None  EKG:   Medications:   Consults: none  89F w/ HTN, vertigo, urinary incontinence, presents with 3-day h/o b/l foot pain and left knee pain, and inability to bear weight today. She does not remember any falls / injury or trauma. Son states that patient has home PT 3 times per week, most recently on Friday, and did not have any issues at that time. Patient reports that she lives alone. Son does not think pt has been taking her meds for several weeks. Patient denies f/c, headaches, chest pain, shortness of breath, abdominal pain, N/V/D/C.    In the ED:  Initial vital signs: T: 99.4 F, HR: 115, BP: 171, R: 18, SpO2: 94% on RA  Labs: significant for Hgb 10.1, Na 131, proBNP 693  B/l LE U/S: no DVT, 5cm Baker cyst  Imaging:  CXR: None  EKG: Pending  Medications:   Consults: none  89F w/ HTN, vertigo, urinary incontinence, presents with 3-day h/o b/l foot pain and left knee pain, and inability to bear weight today. She does not remember any falls, injury, or trauma. Son states that patient has home PT 3 times per week, most recently on Friday, and did not have any issues at that time. Patient reports that she lives alone. Son does not think pt has been taking her meds for several weeks and needs more help at home. Reports no recent hospitalizations, but has been to [facility] in the past. Patient denies f/c, headaches, chest pain, shortness of breath, abdominal pain, N/V/D/C.    In the ED:  Initial vital signs: T: 99.4 F, HR: 115, BP: 171, R: 18, SpO2: 94% on RA  Labs: significant for Hgb 10.1, Na 131, proBNP 693  B/l LE U/S: no DVT, 5cm Baker cyst  Imaging:  CXR: None  EKG: Pending  Medications: Tylenol 1g  Consults: none

## 2022-11-08 NOTE — OCCUPATIONAL THERAPY INITIAL EVALUATION ADULT - PERTINENT HX OF CURRENT PROBLEM, REHAB EVAL
89F w/ HTN, vertigo, urinary incontinence, presents with 3-day h/o b/l foot pain and left knee pain, and inability to bear weight today. She does not remember any falls, injury, or trauma. Son states that patient has home PT 3 times per week, most recently on Friday, and did not have any issues at that time. Patient reports that she lives alone. Son does not think pt has been taking her meds for several weeks and needs more help at home. Reports no recent hospitalizations, but has been to [facility] in the past. Patient denies f/c, headaches, chest pain, shortness of breath, abdominal pain, N/V/D/C.

## 2022-11-08 NOTE — DISCHARGE NOTE NURSING/CASE MANAGEMENT/SOCIAL WORK - NSDCFUADDAPPT_GEN_ALL_CORE_FT
Please follow up with your primary care doctor within 2 weeks of discharge to follow up for your knee and foot pain. Please be sure to bring the results of your xrays from this hospital admission, they are included in this document.

## 2022-11-08 NOTE — H&P ADULT - ATTENDING COMMENTS
Patient was seen and examined at bedside on 11/8/2022 at 1030 am. Patient reports continued left knee pain. ROS is otherwise negative. Vitals, labwork and pertinent imaging reviewed. Physical exam - NAD, AAO x 4, PERRLA, EOMI, MMM, supple neck, chest - CTA b/l, CV - rrr, s1s2, no m/r/g, abd - soft, NTND, + BS, ext - wwp, L knee with edema, small effusion, psych - normal affect, skin - no rash    Plan  -C/w pain control  -Ortho consulted for potential aspiration of the L knee as pain appears acute - suspect OA but given elevated CRP gout/pseudogout in the differential; septic arthritis is very low on the differential given patient's normal ROM, no pain

## 2022-11-08 NOTE — DISCHARGE NOTE NURSING/CASE MANAGEMENT/SOCIAL WORK - NSDCPEFALRISK_GEN_ALL_CORE
For information on Fall & Injury Prevention, visit: [email or web address] OR  [email or web address] OR  https://www.cdc.gov/steadi/patient.html

## 2022-11-08 NOTE — H&P ADULT - PROBLEM SELECTOR PLAN 3
F: None   E: Replete for K<4, Mag<2  N: Regular Diet  A: None  Code status: Full  Dispo: [facility]

## 2022-11-08 NOTE — OCCUPATIONAL THERAPY INITIAL EVALUATION ADULT - ADDITIONAL COMMENTS
Patient reports living alone in an apt building with elevator access and a doorman. Patient has family that lives in [place] [place], that calls to check on her. Patient owns a RW. Patient reports being independent with all ADLs prior to this admission. Denies recent falls.

## 2022-11-08 NOTE — ED PROVIDER NOTE - PROGRESS NOTE DETAILS
xr bilateral feet and xray knee w/o acute injuries  us neg for dvt  basic labs w anemia - no old for comparison. BNP in 600s. age appropriate. otherwise ok  pt still unable to stand / bear weight due to pain  will admit for PT eval and possible CANDY placement. pt and son aware / agreeable.    on arrival hypertensive and tachycardic. per son not taking meds. unclear what meds pt currently on and BP improved, still mildly tachy to 110 but sinus tachy. pt asymptomatic. will defer antihypertensives at this time until son able to get current med list in morning. xr bilateral feet and xray knee w/o acute injuries  us neg for dvt  no cellulitis/open wounds  no e/o compartment syndrome  basic labs w anemia - no old for comparison. BNP in 600s. age appropriate. otherwise ok  pt still unable to stand / bear weight due to pain  will admit for PT eval and possible CANDY placement. pt and son aware / agreeable.    on arrival hypertensive and tachycardic. per son not taking meds. unclear what meds pt currently on and BP improved, still mildly tachy to 110 but sinus tachy in setting of improved but persistent foot pain. will defer antihypertensives at this time until son able to get current med list in morning.

## 2022-11-08 NOTE — DISCHARGE NOTE PROVIDER - HOSPITAL COURSE
89F w/ HTN, vertigo, urinary incontinence, presents with 3-day h/o b/l foot pain and left knee pain, and inability to bear weight today. She does not remember any falls, injury, or trauma. Son states that patient has home PT 3 times per week, most recently on Friday, and did not have any issues at that time. Patient reports that she lives alone. Son does not think pt has been taking her meds for several weeks and needs more help at home. Reports no recent hospitalizations, but has been to [facility] in the past. Patient denies f/c, headaches, chest pain, shortness of breath, abdominal pain, N/V/D/C.    In the ED:  Initial vital signs: T: 99.4 F, HR: 115, BP: 171, R: 18, SpO2: 94% on RA  Labs: significant for Hgb 10.1, Na 131, proBNP 693  B/l LE U/S: no DVT, 5cm Baker cyst  Imaging:  CXR: None  EKG: Pending  Medications: Tylenol 1g  Consults: none     Problem List/Main Diagnoses (system-based):      Problem/Plan - 1:  ·  Problem: Left knee pain.   ·  Plan: Patient presenting with 3 day history of left knee pain and associated bilateral foot pain. Inability to bear weight on his feet the past day, so brought into ED by son. Doppler U/S negative for DVTs, but did show 5cm Baker cyst. Knee pain 2/2 knee tendon injury, cyst, or pleural effusion.  - F/u official read of left knee X-ray  - F/u official read of b/l foot X-rays  - PT/OT consult  - Consider ortho consult for joint injury.     Problem/Plan - 2:  ·  Problem: HTN (hypertension).   ·  Plan: Hypertensive in arrival. Patient reportedly not taking her medications at home.  - Obtain official med rec  - Restart hypertension medications    New medications: Diclofenac topical cream to help with knee pain      89F w/ HTN, vertigo, urinary incontinence, presents with 3-day h/o b/l foot pain and left knee pain, and inability to bear weight today. She does not remember any falls, injury, or trauma. Son states that patient has home PT 3 times per week, most recently on Friday, and did not have any issues at that time. Patient reports that she lives alone. Son does not think pt has been taking her meds for several weeks and needs more help at home. Reports no recent hospitalizations, but has been to [facility] in the past. Patient denies f/c, headaches, chest pain, shortness of breath, abdominal pain, N/V/D/C.    In the ED:  Initial vital signs: T: 99.4 F, HR: 115, BP: 171, R: 18, SpO2: 94% on RA  Labs: significant for Hgb 10.1, Na 131, proBNP 693  B/l LE U/S: no DVT, 5cm Baker cyst  Imaging:  CXR: None  EKG: Pending  Medications: Tylenol 1g  Consults: none     Problem List/Main Diagnoses (system-based):      Problem/Plan - 1:  ·  Problem: Left knee pain.   ·  Plan: Patient presenting with 3 day history of left knee pain and associated bilateral foot pain. Inability to bear weight on his feet the past day, so brought into ED by son. Doppler U/S negative for DVTs, but did show 5cm Baker cyst. Knee pain 2/2 knee tendon injury, cyst, or pleural effusion.  - F/u official read of left knee X-ray  - F/u official read of b/l foot X-rays  - PT/OT consult  - Consider ortho consult for joint injury; they recommend ________________________________     Problem/Plan - 2:  ·  Problem: HTN (hypertension).   ·  Plan: Hypertensive in arrival. Patient reportedly not taking her medications at home.  - Obtain official med rec  - Restart hypertension medications    New medications: Diclofenac topical cream to help with knee pain      89F w/ HTN, vertigo, urinary incontinence, presents with 3-day h/o b/l foot pain and left knee pain, and inability to bear weight today. She does not remember any falls, injury, or trauma. Son states that patient has home PT 3 times per week, most recently on Friday, and did not have any issues at that time. Patient reports that she lives alone. Son does not think pt has been taking her meds for several weeks and needs more help at home. Reports no recent hospitalizations, but has been to [facility] in the past. Patient denies f/c, headaches, chest pain, shortness of breath, abdominal pain, N/V/D/C.    In the ED:  Initial vital signs: T: 99.4 F, HR: 115, BP: 171, R: 18, SpO2: 94% on RA  Labs: significant for Hgb 10.1, Na 131, proBNP 693  B/l LE U/S: no DVT, 5cm Baker cyst  Imaging:  CXR: None  EKG: Pending  Medications: Tylenol 1g  Consults: none     Problem List/Main Diagnoses (system-based):      Problem/Plan - 1:  ·  Problem: Left knee pain.   ·  Plan: Patient presenting with 3 day history of left knee pain and associated bilateral foot pain. Inability to bear weight on his feet the past day, so brought into ED by son. Doppler U/S negative for DVTs, but did show 5cm Baker cyst. Knee pain 2/2 knee tendon injury, cyst, or pleural effusion.  - F/u official read of left knee X-ray  - F/u official read of b/l foot X-rays  - PT/OT consult  - Consider ortho consult for joint injury; they recommend no acute interventions at this time. RICE, ACE wrap, pain control as needed.      Problem/Plan - 2:  ·  Problem: HTN (hypertension).   ·  Plan: Hypertensive in arrival. Patient reportedly not taking her medications at home.  - Obtain official med rec  - Restart hypertension medications    New medications: Diclofenac topical cream to help with knee pain   Labs to be followed outpatient: CBC, CRP  Exam to be followed outpatient:  General: NAD  HEENT: NC/AT; PERRL, anicteric sclera; MMM  Neck: supple  Cardiovascular: +S1/S2; RRR  Respiratory: CTA B/L; no W/R/R  Gastrointestinal: soft, NT/ND; +BSx4  Extremities: WWP; no edema, clubbing or cyanosis; left knee TTP; b/l feet TTP  Vascular: 2+ radial, DP/PT pulses B/L  Neurological: AAOx3; no focal deficits

## 2022-11-08 NOTE — DISCHARGE NOTE PROVIDER - NSDCCPTREATMENT_GEN_ALL_CORE_FT
PRINCIPAL PROCEDURE  Procedure: XR knee RT 3V  Findings and Treatment: Findings/impression: Demineralization. No radiographic findings to suggest presence of osteomyelitis. Joint effusion No acute fracture or   dislocation. Tricompartmental degenerative changes, joint space   narrowing, osteophytosis, chondrocalcinosis. Quadriceps tendon   enthesophyte. Posterior soft tissue vascular calcification.         PRINCIPAL PROCEDURE  Procedure: XR knee RT 3V  Findings and Treatment: Findings/impression: Demineralization. No radiographic findings to suggest presence of osteomyelitis. Joint effusion No acute fracture or   dislocation. Tricompartmental degenerative changes, joint space   narrowing, osteophytosis, chondrocalcinosis. Quadriceps tendon   enthesophyte. Posterior soft tissue vascular calcification.        SECONDARY PROCEDURE  Procedure: XR foot complete  Findings and Treatment: Frontal, lateral and oblique views of the right foot   demonstrate severe osteopenia. No fracture. No dislocation.   Tarsometatarsal joint is in appropriate alignment. Small os navicularis.

## 2022-11-08 NOTE — OCCUPATIONAL THERAPY INITIAL EVALUATION ADULT - LEVEL OF INDEPENDENCE: SIT/STAND, REHAB EVAL
x3 trials attempted, able to perform x1 trial successfully, however unable to achieve upright posture d/t R lateral foot pain/maximum assist (25% patients effort)

## 2022-11-08 NOTE — OCCUPATIONAL THERAPY INITIAL EVALUATION ADULT - GENERAL OBSERVATIONS, REHAB EVAL
Patient received semi-supine in bed, +primafit, +heplock, agreeable to OT session, +friend at bedside, in NAD. PT Silvana present.

## 2022-11-08 NOTE — PATIENT PROFILE ADULT - FUNCTIONAL ASSESSMENT - BASIC MOBILITY 6.
3-calculated by average/Not able to assess (calculate score using [facility] averaging method)

## 2022-11-08 NOTE — PHYSICAL THERAPY INITIAL EVALUATION ADULT - ADDITIONAL COMMENTS
Pt lives alone in an elevator apartment building, walks with rolling walker, assist from friends. No recent falls

## 2022-11-08 NOTE — PATIENT PROFILE ADULT - FALL HARM RISK - HARM RISK INTERVENTIONS

## 2022-11-08 NOTE — ED PROVIDER NOTE - PHYSICAL EXAMINATION
MSK :  left knee - swelling to knee (per son not new issues). no bony tenderness, able to range knee w/o difficulty. no redness / warmth. skin intact.  bilateral feet - tenderness to plantar aspect of foot. pain w/ dorsiflexion. skin intact. no ulcerations, blistering. slight swelling bilateral knee -> distally but symmetric. no skin changes. no erythema or warmth. no focal bony tenderness. 2+ DP pulses. sensation intact all distributions.  extremities otherwise - range of motion is not limited, no muscle or joint tenderness    Constitutional : elderly / frail appearing but non-toxic, no acute distress.  awake, alert, oriented to person, place, time/situation.  Head : head normocephalic, atraumatic  EENMT : eyes clear bilaterally, PERRL, EOMI. airway patent. moist mucous membranes. neck supple.  Cardiac : Normal rate, regular rhythm. No murmur appreciated, no LE edema.  Resp : Breath sounds clear and equal bilaterally. Respirations even and unlabored.   Gastro : abdomen soft, nontender, nondistended. no rebound or guarding.   Back : No evidence of trauma. No spinal or CVA tenderness.  Vasc : Extremities warm and well perfused. 2+ radial and DP pulses. cap refill <2 seconds  Neuro : Alert and oriented, CNII-XII grossly intact, no focal deficits, no motor or sensory deficits.  Skin : Skin normal color for race, warm, dry and intact. No evidence of rash.  Psych : Alert and oriented to person, place, time/situation. normal mood and affect. no apparent risk to self or others.

## 2022-11-08 NOTE — ED PROVIDER NOTE - CLINICAL SUMMARY MEDICAL DECISION MAKING FREE TEXT BOX
per old med list pt w history of htn, vertigo, urinary incontinence. here w atraumatic, bilateral foot pain x 3 days and inability to ambulate. also concern from son that pt has not been taking meds (does not have updated list) for 2-3 weeks concern she may need more help at home  pt elderly / frail appearing but nontoxic. on arrival pt /108 HR 115s. exam notable for swelling to left knee (ongoing per son) and tenderness to plantar aspect of both feels. neurovascularly intact.   unclear cause of pain, doubt fracture given no trauma and no bony tenderness but will get XR to r/o  slight swelling although symmetric - will get dopplers to r/o dvt  reassuring exam - no concern for vascular etiology or cellulitic components at this time.  plan labs, xr, US, analgesia prn  may ultimately need admission for PT eval possible CANDY if unable to ambulate.    pt hypertensive / tachycardic on arrival - suspect some component of pain but pt also off antihypertensives x ?2-3 weeks. no current med list but per old med list (yrs ago) pt on multiple antihypertensives including beta blocker. so vitals may in part be due to med noncompliance.   pt asymptomatic. defer BP meds at this time unless son can confirm current meds. per old med list pt w history of htn, vertigo, urinary incontinence. here w atraumatic, bilateral foot pain x 3 days and inability to ambulate. also concern from son that pt has not been taking meds (does not have updated list) for 2-3 weeks concern she may need more help at home  pt elderly / frail appearing but nontoxic. on arrival pt /108 HR 115s. exam notable for swelling to left knee (ongoing per son) and tenderness to plantar aspect of both feels. neurovascularly intact.   unclear cause of pain, doubt fracture given no trauma and no bony tenderness but will get XR to r/o  slight swelling although symmetric - will get dopplers to r/o dvt  reassuring exam - no concern for compartment syndrome, vascular etiology or cellulitic components at this time.  plan labs, xr, US, analgesia prn  may ultimately need admission for PT eval possible CANDY if unable to ambulate.    pt hypertensive / tachycardic on arrival - suspect some component of pain but pt also off antihypertensives x ?2-3 weeks. no current med list but per old med list (yrs ago) pt on multiple antihypertensives including beta blocker. so vitals may in part be due to med noncompliance vs pain.   defer BP meds at this time unless son can confirm current meds.

## 2022-11-08 NOTE — H&P ADULT - NSHPLABSRESULTS_GEN_ALL_CORE
.  LABS:                         9.8    6.21  )-----------( 236      ( 08 Nov 2022 06:06 )             31.5     11-07    133<L>  |  98  |  14  ----------------------------<  131<H>  4.2   |  24  |  0.79    Ca    10.0      07 Nov 2022 22:30  Mg     2.0     11-07    TPro  6.4  /  Alb  3.5  /  TBili  0.8  /  DBili  x   /  AST  11  /  ALT  6<L>  /  AlkPhos  90  11-07    PT/INR - ( 07 Nov 2022 20:59 )   PT: 14.2 sec;   INR: 1.19          PTT - ( 07 Nov 2022 20:59 )  PTT:25.4 sec    CARDIAC MARKERS ( 07 Nov 2022 20:59 )  x     / x     / See Note / x     / x          Serum Pro-Brain Natriuretic Peptide: 693 pg/mL (11-07 @ 22:30)        RADIOLOGY, EKG & ADDITIONAL TESTS: Reviewed.

## 2022-11-08 NOTE — PHYSICAL THERAPY INITIAL EVALUATION ADULT - GENERAL OBSERVATIONS, REHAB EVAL
pt received supine in bed, friend present, +heplock, +purewick, complain of pain in R foot c pressure

## 2022-11-08 NOTE — DISCHARGE NOTE PROVIDER - NSDCMRMEDTOKEN_GEN_ALL_CORE_FT
amLODIPine 10 mg oral tablet: 1 tab(s) orally once a day  cloNIDine 0.1 mg oral tablet: 1 tab(s) orally once a day  diclofenac 1% topical gel: Apply topically to affected area every 8 hours, As Needed -for moderate pain MDD:16g per day   mirtazapine 7.5 mg oral tablet: 1 tab(s) orally once a day (at bedtime)  spironolactone 50 mg oral tablet: 1 application orally once a day  telmisartan-hydrochlorothiazide 80 mg-12.5 mg oral tablet: 1 tab(s) orally once a day  Toprol- mg oral tablet, extended release: 1 tab(s) orally once a day  zolpidem 5 mg oral tablet: 1 tab(s) orally once a day (at bedtime)

## 2022-11-15 DIAGNOSIS — M71.22 SYNOVIAL CYST OF POPLITEAL SPACE [BAKER], LEFT KNEE: ICD-10-CM

## 2022-11-15 DIAGNOSIS — S86.902A: ICD-10-CM

## 2022-11-15 DIAGNOSIS — R32 UNSPECIFIED URINARY INCONTINENCE: ICD-10-CM
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
## 2022-11-15 DIAGNOSIS — Z88.0 ALLERGY STATUS TO PENICILLIN: ICD-10-CM

## 2022-11-15 DIAGNOSIS — I10 ESSENTIAL (PRIMARY) HYPERTENSION: ICD-10-CM

## 2022-11-15 DIAGNOSIS — M25.462 EFFUSION, LEFT KNEE: ICD-10-CM

## 2022-11-15 DIAGNOSIS — M17.12 UNILATERAL PRIMARY OSTEOARTHRITIS, LEFT KNEE: ICD-10-CM
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
## 2022-11-15 PROBLEM — Z78.9 OTHER SPECIFIED HEALTH STATUS: Chronic | Status: ACTIVE | Noted: 2022-11-08

## 2022-11-18 PROBLEM — Z00.00 ENCOUNTER FOR PREVENTIVE HEALTH EXAMINATION: Status: ACTIVE | Noted: 2022-11-18

## 2022-11-21 ENCOUNTER — APPOINTMENT (OUTPATIENT)
Dept: OTOLARYNGOLOGY | Facility: CLINIC | Age: 87
End: 2022-11-21
